# Patient Record
Sex: MALE | Race: WHITE | NOT HISPANIC OR LATINO | Employment: FULL TIME | URBAN - METROPOLITAN AREA
[De-identification: names, ages, dates, MRNs, and addresses within clinical notes are randomized per-mention and may not be internally consistent; named-entity substitution may affect disease eponyms.]

---

## 2023-07-14 ENCOUNTER — LAB (OUTPATIENT)
Dept: LAB | Facility: LAB | Age: 63
End: 2023-07-14
Payer: COMMERCIAL

## 2023-07-14 ENCOUNTER — OFFICE VISIT (OUTPATIENT)
Dept: PRIMARY CARE | Facility: CLINIC | Age: 63
End: 2023-07-14
Payer: COMMERCIAL

## 2023-07-14 VITALS
WEIGHT: 203 LBS | BODY MASS INDEX: 25.24 KG/M2 | HEIGHT: 75 IN | SYSTOLIC BLOOD PRESSURE: 141 MMHG | DIASTOLIC BLOOD PRESSURE: 89 MMHG | HEART RATE: 67 BPM | OXYGEN SATURATION: 95 %

## 2023-07-14 DIAGNOSIS — E78.5 DYSLIPIDEMIA: ICD-10-CM

## 2023-07-14 DIAGNOSIS — R73.02 IGT (IMPAIRED GLUCOSE TOLERANCE): ICD-10-CM

## 2023-07-14 DIAGNOSIS — Z00.00 ROUTINE GENERAL MEDICAL EXAMINATION AT A HEALTH CARE FACILITY: ICD-10-CM

## 2023-07-14 DIAGNOSIS — D64.9 ANEMIA, UNSPECIFIED TYPE: ICD-10-CM

## 2023-07-14 DIAGNOSIS — E03.9 HYPOTHYROIDISM, UNSPECIFIED TYPE: ICD-10-CM

## 2023-07-14 DIAGNOSIS — Z00.00 WELLNESS EXAMINATION: Primary | ICD-10-CM

## 2023-07-14 LAB
ALANINE AMINOTRANSFERASE (SGPT) (U/L) IN SER/PLAS: 16 U/L (ref 10–52)
ALBUMIN (G/DL) IN SER/PLAS: 4.2 G/DL (ref 3.4–5)
ALKALINE PHOSPHATASE (U/L) IN SER/PLAS: 72 U/L (ref 33–136)
ANION GAP IN SER/PLAS: 13 MMOL/L (ref 10–20)
ASPARTATE AMINOTRANSFERASE (SGOT) (U/L) IN SER/PLAS: 20 U/L (ref 9–39)
BASOPHILS (10*3/UL) IN BLOOD BY AUTOMATED COUNT: 0.07 X10E9/L (ref 0–0.1)
BASOPHILS/100 LEUKOCYTES IN BLOOD BY AUTOMATED COUNT: 1.2 % (ref 0–2)
BILIRUBIN TOTAL (MG/DL) IN SER/PLAS: 0.6 MG/DL (ref 0–1.2)
CALCIUM (MG/DL) IN SER/PLAS: 9 MG/DL (ref 8.6–10.6)
CARBON DIOXIDE, TOTAL (MMOL/L) IN SER/PLAS: 23 MMOL/L (ref 21–32)
CHLORIDE (MMOL/L) IN SER/PLAS: 108 MMOL/L (ref 98–107)
CHOLESTEROL (MG/DL) IN SER/PLAS: 189 MG/DL (ref 0–199)
CHOLESTEROL IN HDL (MG/DL) IN SER/PLAS: 36.1 MG/DL
CHOLESTEROL/HDL RATIO: 5.2
CREATININE (MG/DL) IN SER/PLAS: 1.32 MG/DL (ref 0.5–1.3)
EOSINOPHILS (10*3/UL) IN BLOOD BY AUTOMATED COUNT: 0.26 X10E9/L (ref 0–0.7)
EOSINOPHILS/100 LEUKOCYTES IN BLOOD BY AUTOMATED COUNT: 4.5 % (ref 0–6)
ERYTHROCYTE DISTRIBUTION WIDTH (RATIO) BY AUTOMATED COUNT: 14.8 % (ref 11.5–14.5)
ERYTHROCYTE MEAN CORPUSCULAR HEMOGLOBIN CONCENTRATION (G/DL) BY AUTOMATED: 31.7 G/DL (ref 32–36)
ERYTHROCYTE MEAN CORPUSCULAR VOLUME (FL) BY AUTOMATED COUNT: 92 FL (ref 80–100)
ERYTHROCYTES (10*6/UL) IN BLOOD BY AUTOMATED COUNT: 5.89 X10E12/L (ref 4.5–5.9)
ESTIMATED AVERAGE GLUCOSE FOR HBA1C: 105 MG/DL
GFR MALE: 60 ML/MIN/1.73M2
GLUCOSE (MG/DL) IN SER/PLAS: 91 MG/DL (ref 74–99)
HEMATOCRIT (%) IN BLOOD BY AUTOMATED COUNT: 54.3 % (ref 41–52)
HEMOGLOBIN (G/DL) IN BLOOD: 17.2 G/DL (ref 13.5–17.5)
HEMOGLOBIN A1C/HEMOGLOBIN TOTAL IN BLOOD: 5.3 %
IMMATURE GRANULOCYTES/100 LEUKOCYTES IN BLOOD BY AUTOMATED COUNT: 0.7 % (ref 0–0.9)
LDL: 117 MG/DL (ref 0–99)
LEUKOCYTES (10*3/UL) IN BLOOD BY AUTOMATED COUNT: 5.8 X10E9/L (ref 4.4–11.3)
LYMPHOCYTES (10*3/UL) IN BLOOD BY AUTOMATED COUNT: 1.36 X10E9/L (ref 1.2–4.8)
LYMPHOCYTES/100 LEUKOCYTES IN BLOOD BY AUTOMATED COUNT: 23.6 % (ref 13–44)
MONOCYTES (10*3/UL) IN BLOOD BY AUTOMATED COUNT: 0.66 X10E9/L (ref 0.1–1)
MONOCYTES/100 LEUKOCYTES IN BLOOD BY AUTOMATED COUNT: 11.4 % (ref 2–10)
NEUTROPHILS (10*3/UL) IN BLOOD BY AUTOMATED COUNT: 3.38 X10E9/L (ref 1.2–7.7)
NEUTROPHILS/100 LEUKOCYTES IN BLOOD BY AUTOMATED COUNT: 58.6 % (ref 40–80)
NRBC (PER 100 WBCS) BY AUTOMATED COUNT: 0 /100 WBC (ref 0–0)
PLATELETS (10*3/UL) IN BLOOD AUTOMATED COUNT: 247 X10E9/L (ref 150–450)
POTASSIUM (MMOL/L) IN SER/PLAS: 4.5 MMOL/L (ref 3.5–5.3)
PROTEIN TOTAL: 7.2 G/DL (ref 6.4–8.2)
SODIUM (MMOL/L) IN SER/PLAS: 139 MMOL/L (ref 136–145)
THYROTROPIN (MIU/L) IN SER/PLAS BY DETECTION LIMIT <= 0.05 MIU/L: 2.38 MIU/L (ref 0.44–3.98)
TRIGLYCERIDE (MG/DL) IN SER/PLAS: 178 MG/DL (ref 0–149)
UREA NITROGEN (MG/DL) IN SER/PLAS: 17 MG/DL (ref 6–23)
VLDL: 36 MG/DL (ref 0–40)

## 2023-07-14 PROCEDURE — G0442 ANNUAL ALCOHOL SCREEN 15 MIN: HCPCS | Performed by: EMERGENCY MEDICINE

## 2023-07-14 PROCEDURE — 85025 COMPLETE CBC W/AUTO DIFF WBC: CPT

## 2023-07-14 PROCEDURE — 80061 LIPID PANEL: CPT

## 2023-07-14 PROCEDURE — 84443 ASSAY THYROID STIM HORMONE: CPT

## 2023-07-14 PROCEDURE — 99212 OFFICE O/P EST SF 10 MIN: CPT | Performed by: EMERGENCY MEDICINE

## 2023-07-14 PROCEDURE — 83036 HEMOGLOBIN GLYCOSYLATED A1C: CPT

## 2023-07-14 PROCEDURE — 80053 COMPREHEN METABOLIC PANEL: CPT

## 2023-07-14 PROCEDURE — 36415 COLL VENOUS BLD VENIPUNCTURE: CPT

## 2023-07-14 PROCEDURE — G0444 DEPRESSION SCREEN ANNUAL: HCPCS | Performed by: EMERGENCY MEDICINE

## 2023-07-14 PROCEDURE — 99396 PREV VISIT EST AGE 40-64: CPT | Performed by: EMERGENCY MEDICINE

## 2023-07-14 PROCEDURE — 1036F TOBACCO NON-USER: CPT | Performed by: EMERGENCY MEDICINE

## 2023-07-14 NOTE — PROGRESS NOTES
Diagnoses/Problems     · Encounter for preventive health examination (V70.0) (Z00.00)     · Physical exam (V70.9) (Z00.00)   · Right inguinal hernia (550.90) (K40.90)   · Overweight with body mass index (BMI) of 25 to 25.9 in adult (278.02,V85.21)  (E66.3,Z68.25)   · Abnormal EKG (794.31) (R94.31)   · Hyperlipidemia (272.4) (E78.5)   · Impetigo (684) (L01.00)     Orders  Abnormal EKG, Health Maintenance, Hyperlipidemia, Impetigo, Overweight with body  mass index (BMI) of 25 to 25.9 in adult    · Lipid Panel; Status:In Progress - Specimen/Data Collected;   Done: 29Jun2022   · TSH - Thyroid Stimulating Hormone, Serum; Status:In Progress - Specimen/Data  Collected;   Done: 29Jun2022  Abnormal EKG, Health Maintenance, Hyperlipidemia, Overweight with body mass index  (BMI) of 25 to 25.9 in adult    · Complete Blood Count + Differential; Status:In Progress - Specimen/Data Collected;    Done: 29Jun2022   · Comprehensive Metabolic Panel; Status:In Progress - Specimen/Data Collected;   Done:  29Jun2022   · Hemoglobin A1C; Status:In Progress - Specimen/Data Collected;   Done: 29Jun2022     Provider Impressions     62-year-old male here for physical.      Its been about 9 years since his last colonoscopy.  His aunt had colon cancer -we will refer him to GI and he will schedule his colonoscopy with them     Foot callus which is open-patient will follow-up with Dr. Curtis.  Had foot surgery in the same location before    PH Qâ€“9 depression screening was completed by authorized employee of the practice and spent 15 minutes explaining the questionnaire and discussing the answers with the patient.        Will order Cologuard for next year        Labs  Follow up in 3 months or as needed.      History of Present Illness  62-year-old male here for physical.     Patient has no acute symptoms     Past Medical Hx:  Hernia  Rash on the face.     Social Hx:  Patient doesn't drink or smoke      Review of Systems  All eleven systems were  reviewed with the patient and were negative for any symptoms other than what is documented in the history of present illness.        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)   · History of Thrombophlebitis Of Deep Vessels Of The Lower Extremity (V12.51)   · History of Vitamin D deficiency (268.9) (E55.9)     Surgical History     · History of Complete Colonoscopy   · 12/18/09, 12/2014   · History of Tonsillectomy With Adenoidectomy   · History of Total Hip Replacement   · L 12/17/12     Social History     · Being A Social Drinker   · Caffeine Use   · Marital History - Currently    · Non-smoker (V49.89) (Z78.9)     Allergies     · No Known Drug Allergies   Recorded By: Yadira Santos; 2/28/2013 1:25:03 PM     Vitals  Vital Signs     Recorded: 29Jun2022 02:21PM   Heart Rate 58   Systolic 130   Diastolic 86   Height 6 ft 2 in   Weight 196 lb    BMI Calculated 25.17 kg/m2   BSA Calculated 2.15   Tobacco Use b) No   PHQ-2  #1. Over the last 2 weeks have you felt down, depressed or hopeless?  (If yes, answer PHQ-9 below) No   PHQ-2  #2. Over the last 2 weeks have you felt little interest  or pleasure in doing things?  (If yes, answer PHQ-9 below) No   Falls Screening (Age 18+) a) No falls within the last year   O2 Saturation 95      Physical Exam  Vital signs as per nursing/MA documentation  General appearance: Alert and in no acute  distress  HEENT: Normal Inspection  Neck - Normal Inspection  Respiratory : No respiratory distress. Lungs are clear   Cardiovascular: heart rate normal. No gallop  Back - normal inspection  Skin inspection:Warm  Musculoskeletal : No deformities  Neuro : Limited exam. Baseline  Psychiatric : Cooperative

## 2024-10-10 ENCOUNTER — TELEPHONE (OUTPATIENT)
Dept: PRIMARY CARE | Facility: CLINIC | Age: 64
End: 2024-10-10
Payer: COMMERCIAL

## 2024-10-10 DIAGNOSIS — R73.02 IGT (IMPAIRED GLUCOSE TOLERANCE): ICD-10-CM

## 2024-10-10 DIAGNOSIS — D64.9 ANEMIA, UNSPECIFIED TYPE: ICD-10-CM

## 2024-10-10 DIAGNOSIS — Z00.00 ROUTINE GENERAL MEDICAL EXAMINATION AT A HEALTH CARE FACILITY: ICD-10-CM

## 2024-10-10 DIAGNOSIS — E78.5 DYSLIPIDEMIA: ICD-10-CM

## 2024-10-10 DIAGNOSIS — E03.9 HYPOTHYROIDISM, UNSPECIFIED TYPE: ICD-10-CM

## 2024-10-10 NOTE — TELEPHONE ENCOUNTER
Pt called and I scheduled him for his mcw on 10/25 he was wondering if he could get blood work done before

## 2024-10-17 LAB
NON-UH HIE A/G RATIO: 1.2
NON-UH HIE ALB: 3.7 G/DL (ref 3.4–5)
NON-UH HIE ALK PHOS: 75 UNIT/L (ref 45–117)
NON-UH HIE BASO COUNT: 0.14 X1000 (ref 0–0.2)
NON-UH HIE BASOS %: 2.3 %
NON-UH HIE BILIRUBIN, TOTAL: 0.6 MG/DL (ref 0.3–1.2)
NON-UH HIE BUN/CREAT RATIO: 12.3
NON-UH HIE BUN: 16 MG/DL (ref 9–23)
NON-UH HIE CALCIUM: 9.3 MG/DL (ref 8.7–10.4)
NON-UH HIE CALCULATED LDL CHOLESTEROL: 118 MG/DL (ref 60–130)
NON-UH HIE CALCULATED OSMOLALITY: 282 MOSM/KG (ref 275–295)
NON-UH HIE CHLORIDE: 109 MMOL/L (ref 98–107)
NON-UH HIE CHOLESTEROL: 209 MG/DL (ref 100–200)
NON-UH HIE CO2, VENOUS: 23 MMOL/L (ref 20–31)
NON-UH HIE CREATININE: 1.3 MG/DL (ref 0.6–1.1)
NON-UH HIE DIFF?: NO
NON-UH HIE DXH ACTIONS: ABNORMAL
NON-UH HIE EOS COUNT: 0.44 X1000 (ref 0–0.5)
NON-UH HIE EOSIN %: 7 %
NON-UH HIE GFR AA: >60
NON-UH HIE GLOBULIN: 3 G/DL
NON-UH HIE GLOMERULAR FILTRATION RATE: 55 ML/MIN/1.73M?
NON-UH HIE GLUCOSE: 96 MG/DL (ref 74–106)
NON-UH HIE GOT: 22 UNIT/L (ref 15–37)
NON-UH HIE GPT: 24 UNIT/L (ref 10–49)
NON-UH HIE HCT: 52.3 % (ref 41–52)
NON-UH HIE HDL CHOLESTEROL: 31 MG/DL (ref 40–60)
NON-UH HIE HGB A1C: 5.3 %
NON-UH HIE HGB: 18 G/DL (ref 13.5–17.5)
NON-UH HIE INSTR WBC: 6.3
NON-UH HIE K: 4.2 MMOL/L (ref 3.5–5.1)
NON-UH HIE LYMPH %: 20.4 %
NON-UH HIE LYMPH COUNT: 1.28 X1000 (ref 1.2–4.8)
NON-UH HIE MCH: 31.4 PG (ref 27–34)
NON-UH HIE MCHC: 34.5 G/DL (ref 32–37)
NON-UH HIE MCV: 90.9 FL (ref 80–100)
NON-UH HIE MONO %: 13.7 %
NON-UH HIE MONO COUNT: 0.86 X1000 (ref 0.1–1)
NON-UH HIE MPV: 9.1 FL (ref 7.4–10.4)
NON-UH HIE NA: 141 MMOL/L (ref 135–145)
NON-UH HIE NEUTROPHIL %: 56.6 %
NON-UH HIE NEUTROPHIL COUNT (ANC): 3.57 X1000 (ref 1.4–8.8)
NON-UH HIE NUCLEATED RBC: 0 /100WBC
NON-UH HIE PLATELET: 184 X10 (ref 150–450)
NON-UH HIE RBC: 5.75 X10 (ref 4.7–6.1)
NON-UH HIE RDW: 14.3 % (ref 11.5–14.5)
NON-UH HIE TOTAL CHOL/HDL CHOL RATIO: 6.7
NON-UH HIE TOTAL PROTEIN: 6.7 G/DL (ref 5.7–8.2)
NON-UH HIE TRIGLYCERIDES: 300 MG/DL (ref 30–150)
NON-UH HIE TSH: 3.31 UIU/ML (ref 0.55–4.78)
NON-UH HIE WBC: 6.3 X10 (ref 4.5–11)

## 2024-10-25 ENCOUNTER — APPOINTMENT (OUTPATIENT)
Dept: PRIMARY CARE | Facility: CLINIC | Age: 64
End: 2024-10-25
Payer: COMMERCIAL

## 2024-10-25 VITALS
SYSTOLIC BLOOD PRESSURE: 132 MMHG | OXYGEN SATURATION: 95 % | DIASTOLIC BLOOD PRESSURE: 78 MMHG | HEIGHT: 75 IN | BODY MASS INDEX: 26.51 KG/M2 | WEIGHT: 213.2 LBS | HEART RATE: 67 BPM

## 2024-10-25 DIAGNOSIS — Z00.00 ROUTINE GENERAL MEDICAL EXAMINATION AT HEALTH CARE FACILITY: Primary | ICD-10-CM

## 2024-10-25 DIAGNOSIS — E78.1 HYPERTRIGLYCERIDEMIA: ICD-10-CM

## 2024-10-25 PROCEDURE — 3008F BODY MASS INDEX DOCD: CPT | Performed by: EMERGENCY MEDICINE

## 2024-10-25 PROCEDURE — 1036F TOBACCO NON-USER: CPT | Performed by: EMERGENCY MEDICINE

## 2024-10-25 PROCEDURE — 1159F MED LIST DOCD IN RCRD: CPT | Performed by: EMERGENCY MEDICINE

## 2024-10-25 PROCEDURE — 99397 PER PM REEVAL EST PAT 65+ YR: CPT | Performed by: EMERGENCY MEDICINE

## 2024-10-25 ASSESSMENT — PATIENT HEALTH QUESTIONNAIRE - PHQ9
1. LITTLE INTEREST OR PLEASURE IN DOING THINGS: NOT AT ALL
SUM OF ALL RESPONSES TO PHQ9 QUESTIONS 1 AND 2: 0
2. FEELING DOWN, DEPRESSED OR HOPELESS: NOT AT ALL

## 2024-10-25 NOTE — PROGRESS NOTES
"Subjective    Patient ID: Torito Rondon is a 64 y.o. male who presents for Annual Exam     HPI   62-year-old male here for physical.     Concerned about weight gain. Patient has gained 15-20 lbs. Patient is going to [employer] multiples times a week. Not watching diet.      Past Medical Hx:  Meniere's disease  Hernia  Rash on the face.     Social Hx:  Patient doesn't drink or smoke      Review of Systems  All eleven systems were reviewed with the patient and were negative for any symptoms other than what is documented in the history of present illness.    Past Medical History:   Diagnosis Date    Other conditions influencing health status     Methicillin Resistant Staphylococcus Aureus Infection    Other conditions influencing health status     Thrombophlebitis Of Deep Vessels Of The Lower Extremity    Pain in unspecified hip     Joint pain, hip    Personal history of (healed) traumatic fracture     History of closed fracture of nasal bones    Personal history of other diseases of urinary system     History of hematuria    Vitamin D deficiency, unspecified     Vitamin D deficiency      Past Surgical History:   Procedure Laterality Date    COLONOSCOPY  01/23/2015    Complete Colonoscopy    TONSILLECTOMY  02/28/2013    Tonsillectomy With Adenoidectomy    TOTAL HIP ARTHROPLASTY  02/28/2013    Total Hip Replacement      No family history on file.      Social History     Tobacco Use    Smoking status: Never    Smokeless tobacco: Never   Substance Use Topics    Alcohol use: Yes         No Known Allergies       Objective    /78   Pulse 67   Ht 1.905 m (6' 3\")   Wt 96.7 kg (213 lb 3.2 oz)   SpO2 95%   BMI 26.65 kg/m²       Physical Exam  Vital signs as per nursing/MA documentation  General appearance: Alert and in no acute distress  HEENT: Normal Inspection  Neck - Normal Inspection  Respiratory : No respiratory distress. Lungs are clear   Cardiovascular: heart rate normal. No gallop  Back - normal inspection  Skin " inspection:Warm  Musculoskeletal : No deformities  Neuro : Limited exam. Baseline  Psychiatric : Cooperative    Assessment/Plan    Torito was seen today for annual exam.  Diagnoses and all orders for this visit:  Hypertriglyceridemia (Primary)     Hypertriglyceridemia- Advised to follow healthy diet and exercise.    Foot callus which is open-patient will follow-up with Dr. Curtis.  Had foot surgery in the same location before    Preventative-   Cologuard- Reviewed 2024    Follow-up in 3 months or as needed.

## 2025-07-09 ENCOUNTER — TELEPHONE (OUTPATIENT)
Dept: PRIMARY CARE | Facility: CLINIC | Age: 65
End: 2025-07-09
Payer: COMMERCIAL

## 2025-07-09 DIAGNOSIS — R73.02 IGT (IMPAIRED GLUCOSE TOLERANCE): ICD-10-CM

## 2025-07-09 DIAGNOSIS — E78.5 DYSLIPIDEMIA: ICD-10-CM

## 2025-07-09 DIAGNOSIS — E03.9 HYPOTHYROIDISM, UNSPECIFIED TYPE: ICD-10-CM

## 2025-07-09 DIAGNOSIS — D64.9 ANEMIA, UNSPECIFIED TYPE: ICD-10-CM

## 2025-07-09 DIAGNOSIS — Z00.00 ROUTINE GENERAL MEDICAL EXAMINATION AT A HEALTH CARE FACILITY: ICD-10-CM

## 2025-10-31 ENCOUNTER — APPOINTMENT (OUTPATIENT)
Dept: PRIMARY CARE | Facility: CLINIC | Age: 65
End: 2025-10-31
Payer: COMMERCIAL